# Patient Record
Sex: FEMALE | Race: WHITE | HISPANIC OR LATINO
[De-identification: names, ages, dates, MRNs, and addresses within clinical notes are randomized per-mention and may not be internally consistent; named-entity substitution may affect disease eponyms.]

---

## 2021-08-03 ENCOUNTER — NON-APPOINTMENT (OUTPATIENT)
Age: 21
End: 2021-08-03

## 2021-08-04 PROBLEM — Z00.00 ENCOUNTER FOR PREVENTIVE HEALTH EXAMINATION: Status: ACTIVE | Noted: 2021-08-04

## 2021-08-11 ENCOUNTER — APPOINTMENT (OUTPATIENT)
Dept: OBGYN | Facility: CLINIC | Age: 21
End: 2021-08-11

## 2021-09-10 ENCOUNTER — APPOINTMENT (OUTPATIENT)
Dept: OBGYN | Facility: CLINIC | Age: 21
End: 2021-09-10
Payer: COMMERCIAL

## 2021-09-10 VITALS
DIASTOLIC BLOOD PRESSURE: 60 MMHG | WEIGHT: 150 LBS | BODY MASS INDEX: 28.32 KG/M2 | SYSTOLIC BLOOD PRESSURE: 120 MMHG | HEIGHT: 61 IN

## 2021-09-10 PROCEDURE — 99203 OFFICE O/P NEW LOW 30 MIN: CPT

## 2021-09-10 NOTE — HISTORY OF PRESENT ILLNESS
[FreeTextEntry1] : 20-year-old female  presenting office for a consultation for contraceptive options.  Patient has actually contacted office on 2021 secondary to being exposed to COVID-19 and having to cancel her appointment.  She was in need of a refill of her contraceptive, as she was running out.  She was counseled via telephone in her prescription was sent to pharmacy with direction.  She presents to the office today for follow-up secondary to starting this contraception, and to check her blood pressure, manually.\par \par Denies obstetric history. Denies gynecologic history such as uterine fibroids, ovarian cyst. She does have a history of an STI which was treated in 2019. She is currently on an oral contraceptive and is happy with the medication and would like to continue it.  She reports having a Pap smear via [facility] and will have these results forwarded to our office.  Denies of medical and surgical history. Denies personal or family history of gynecologic, breast, colon cancer, or blood dyscrasias. No family or personal history of hypertension, migraines. Denies alcohol, tobacco, illicit drug use. Denies allergies to medications.

## 2021-09-10 NOTE — PLAN
[FreeTextEntry1] : \par Risks, benefits, and alternatives of contraceptive options discussed at length and patient is to continue her OCP which was written for on 8/11/2021.  She was provided refills of this medication, for 1 years time.  Her blood pressure in office today via a wall-mounted sphygmomanometer is 100/60 and reassuring.  She is happy with the medication and wishes to continue.  She will have her Pap smear forwarded from [facility] and this will be reviewed.  She is to return to office in 1 years time, or as needed.  All questions addressed.

## 2021-11-19 ENCOUNTER — APPOINTMENT (OUTPATIENT)
Dept: OBGYN | Facility: CLINIC | Age: 21
End: 2021-11-19
Payer: COMMERCIAL

## 2021-11-19 VITALS
HEIGHT: 61 IN | HEART RATE: 82 BPM | SYSTOLIC BLOOD PRESSURE: 129 MMHG | BODY MASS INDEX: 28.32 KG/M2 | WEIGHT: 150 LBS | DIASTOLIC BLOOD PRESSURE: 88 MMHG

## 2021-11-19 DIAGNOSIS — Z01.419 ENCOUNTER FOR GYNECOLOGICAL EXAMINATION (GENERAL) (ROUTINE) W/OUT ABNORMAL FINDINGS: ICD-10-CM

## 2021-11-19 PROCEDURE — 99395 PREV VISIT EST AGE 18-39: CPT

## 2021-11-19 NOTE — PLAN
[FreeTextEntry1] : \par Clinical breast exam performed and self breast exam explained with understanding and patient was given indications for early ultrasound/mammogram.\par Pap smear performed with [facility]\par Patient to continue with her primary care physician, as directed\par Patient is happy with her current OCP and will continue this medication and was once again directed on risk of VTE\par All questions addressed\par Return office in 1 years time, or as needed.

## 2021-11-19 NOTE — PHYSICAL EXAM
[Chaperone Present] : A chaperone was present in the examining room during all aspects of the physical examination [Appropriately responsive] : appropriately responsive [Alert] : alert [No Acute Distress] : no acute distress [No Murmurs] : no murmurs [Soft] : soft [Non-tender] : non-tender [Non-distended] : non-distended [No HSM] : No HSM [No Lesions] : no lesions [No Mass] : no mass [Oriented x3] : oriented x3 [Examination Of The Breasts] : a normal appearance [Breast Palpation Diffuse Fibrous Tissue Bilateral] : fibrocystic changes [No Masses] : no breast masses were palpable [Labia Majora] : normal [Labia Minora] : normal [Normal] : normal [Uterine Adnexae] : normal

## 2021-11-21 LAB
C TRACH RRNA SPEC QL NAA+PROBE: NOT DETECTED
N GONORRHOEA RRNA SPEC QL NAA+PROBE: NOT DETECTED
SOURCE TP AMPLIFICATION: NORMAL

## 2021-11-29 LAB — CYTOLOGY CVX/VAG DOC THIN PREP: NORMAL

## 2022-06-27 ENCOUNTER — RX RENEWAL (OUTPATIENT)
Age: 22
End: 2022-06-27

## 2022-11-21 ENCOUNTER — APPOINTMENT (OUTPATIENT)
Dept: OBGYN | Facility: CLINIC | Age: 22
End: 2022-11-21

## 2022-11-21 VITALS
DIASTOLIC BLOOD PRESSURE: 83 MMHG | BODY MASS INDEX: 30.18 KG/M2 | HEIGHT: 62 IN | SYSTOLIC BLOOD PRESSURE: 125 MMHG | WEIGHT: 164 LBS

## 2022-11-21 PROCEDURE — 99395 PREV VISIT EST AGE 18-39: CPT

## 2022-11-21 RX ORDER — NORGESTIMATE AND ETHINYL ESTRADIOL 7DAYSX3 28
0.18/0.215/0.25 KIT ORAL
Qty: 28 | Refills: 11 | Status: COMPLETED | COMMUNITY
Start: 2021-08-11 | End: 2022-11-21

## 2022-11-21 NOTE — PHYSICAL EXAM
[Appropriately responsive] : appropriately responsive [Alert] : alert [No Acute Distress] : no acute distress [No Murmurs] : no murmurs [Soft] : soft [Non-tender] : non-tender [Non-distended] : non-distended [No HSM] : No HSM [No Lesions] : no lesions [No Mass] : no mass [Oriented x3] : oriented x3 [Examination Of The Breasts] : a normal appearance [Breast Palpation Diffuse Fibrous Tissue Bilateral] : fibrocystic changes [No Masses] : no breast masses were palpable [Labia Majora] : normal [Labia Minora] : normal [Normal] : normal [FreeTextEntry5] : Cervical Pap smear performed

## 2022-11-21 NOTE — COUNSELING
[Nutrition/ Exercise/ Weight Management] : nutrition, exercise, weight management [Vitamins/Supplements] : vitamins/supplements [Breast Self Exam] : breast self exam [Contraception/ Emergency Contraception/ Safe Sexual Practices] : contraception, emergency contraception, safe sexual practices [Confidentiality] : confidentiality [STD (testing, results, tx)] : STD (testing, results, tx) [Medication Management] : medication management

## 2022-11-21 NOTE — PLAN
[FreeTextEntry1] : \par Clinical breast exam performed and self breast exam explained with understanding along with indications for early screening.  Patient did have a history of a lump which was examined via ultrasound 7 years prior on the right.  It is not bothering her, but is sensitive at the time of her menstruation.\par Cervical Pap smear performed\par Patient wishes to discontinue her GLADIS at this time and she was counseled on alternatives and declined at this time.  She may be interested in a long-acting reproductive contraceptive, specifically Kyleena or Adelia.  She was handed pamphlets and may contact the office if she decides to go forward with either device.  Patient was counseled on her risk of pregnancy.\par All questions addressed\par She should follow with her PCP, as directed\par She may return to office in 1 years time, or as needed.

## 2022-11-21 NOTE — HISTORY OF PRESENT ILLNESS
[FreeTextEntry1] : 21-year-old female  presenting to office for annual woman appointment.  And to discuss her current contraception.  Patient is thinking of taking a break for contraception secondary to acne, to see how her skin reacts to being off of contraception.  She also reports a weight gain over the past 4 years, which she is concerned of and thinks is related to her combined oral contraceptive.\par \par Denies obstetric history. Denies gynecologic history such as uterine fibroids, ovarian cyst. She does have a history of an STI which was treated in 2019.  Her last Pap smear 2021 resulted NILM.  Denies of medical history.  Surgical history of tonsillectomy. Denies personal or family history of gynecologic, breast, colon cancer, or blood dyscrasias. No family or personal history of hypertension, migraines. Denies alcohol, tobacco, illicit drug use. Denies allergies to medications.

## 2022-12-04 LAB — CYTOLOGY CVX/VAG DOC THIN PREP: NORMAL

## 2024-01-05 ENCOUNTER — APPOINTMENT (OUTPATIENT)
Dept: OBGYN | Facility: CLINIC | Age: 24
End: 2024-01-05
Payer: COMMERCIAL

## 2024-01-05 VITALS
WEIGHT: 152 LBS | BODY MASS INDEX: 27.97 KG/M2 | SYSTOLIC BLOOD PRESSURE: 110 MMHG | HEIGHT: 62 IN | DIASTOLIC BLOOD PRESSURE: 70 MMHG

## 2024-01-05 DIAGNOSIS — Z30.40 ENCOUNTER FOR SURVEILLANCE OF CONTRACEPTIVES, UNSPECIFIED: ICD-10-CM

## 2024-01-05 DIAGNOSIS — Z30.09 ENCOUNTER FOR OTHER GENERAL COUNSELING AND ADVICE ON CONTRACEPTION: ICD-10-CM

## 2024-01-05 DIAGNOSIS — Z01.419 ENCOUNTER FOR GYNECOLOGICAL EXAMINATION (GENERAL) (ROUTINE) W/OUT ABNORMAL FINDINGS: ICD-10-CM

## 2024-01-05 PROCEDURE — 99395 PREV VISIT EST AGE 18-39: CPT

## 2024-01-05 NOTE — HISTORY OF PRESENT ILLNESS
[FreeTextEntry1] : 23-year-old female G0, P0 presenting office for annual well woman appointment.  Patient has discontinued her combined oral contraceptive and is doing well and does not wish to have an alternative at this time.  She has no complaints.  Denies obstetric history. Denies gynecologic history such as uterine fibroids, ovarian cyst. She does have a history of an STI which was treated in 2019.  Her last Pap smear 11/21/2022 resulted NILM and STI Negative.  Denies of medical history.  Surgical history of tonsillectomy. Denies personal or family history of gynecologic, breast, colon cancer, or blood dyscrasias. No family or personal history of hypertension, migraines. Denies alcohol, tobacco, illicit drug use. Denies allergies to medications.

## 2024-01-05 NOTE — PLAN
[FreeTextEntry1] : Clinical breast exam performed self breast exam explained along with indications for early screening.  Cervical Pap smear or STI screening performed.  No obvious signs of STI on examination.  Patient has discontinued her combined oral contraceptive and does not wish for alternatives.  She understands that birth control may be provided at her request.  She has a primary care physician.  She may return to office in 1 years time, or as needed.  She was given opportunity ask questions all questions were addressed.

## 2024-01-05 NOTE — PHYSICAL EXAM
[Appropriately responsive] : appropriately responsive [Alert] : alert [No Acute Distress] : no acute distress [Soft] : soft [Non-tender] : non-tender [Non-distended] : non-distended [No HSM] : No HSM [No Lesions] : no lesions [No Mass] : no mass [Oriented x3] : oriented x3 [Examination Of The Breasts] : a normal appearance [Breast Palpation Diffuse Fibrous Tissue Bilateral] : fibrocystic changes [No Masses] : no breast masses were palpable [Labia Majora] : normal [Labia Minora] : normal [Normal] : normal [FreeTextEntry5] : Cervical Pap smear performed

## 2024-01-10 LAB
C TRACH RRNA SPEC QL NAA+PROBE: NOT DETECTED
CYTOLOGY CVX/VAG DOC THIN PREP: NORMAL
N GONORRHOEA RRNA SPEC QL NAA+PROBE: NOT DETECTED
SOURCE TP AMPLIFICATION: NORMAL

## 2024-07-09 NOTE — HISTORY OF PRESENT ILLNESS
[FreeTextEntry1] : 20-year-old female  presenting office for her annual well woman appointment and follow-up regarding initiation of contraception.  She has no complaints at this time.  She is follows with a regular primary care physician.\par \par Denies obstetric history. Denies gynecologic history such as uterine fibroids, ovarian cyst. She does have a history of an STI which was treated in 2019. She is currently on an oral contraceptive and is happy with the medication and would like to continue it.  Her last Pap smear 2020 resulted NILM via [facility].  Denies of medical and surgical history. Denies personal or family history of gynecologic, breast, colon cancer, or blood dyscrasias. No family or personal history of hypertension, migraines. Denies alcohol, tobacco, illicit drug use. Denies allergies to medications. 1